# Patient Record
Sex: MALE | Race: BLACK OR AFRICAN AMERICAN | Employment: FULL TIME | URBAN - METROPOLITAN AREA
[De-identification: names, ages, dates, MRNs, and addresses within clinical notes are randomized per-mention and may not be internally consistent; named-entity substitution may affect disease eponyms.]

---

## 2017-01-26 ENCOUNTER — OFFICE VISIT (OUTPATIENT)
Dept: FAMILY MEDICINE CLINIC | Age: 57
End: 2017-01-26

## 2017-01-26 VITALS
SYSTOLIC BLOOD PRESSURE: 105 MMHG | HEIGHT: 70 IN | TEMPERATURE: 98 F | OXYGEN SATURATION: 97 % | BODY MASS INDEX: 29.95 KG/M2 | RESPIRATION RATE: 20 BRPM | HEART RATE: 82 BPM | WEIGHT: 209.2 LBS | DIASTOLIC BLOOD PRESSURE: 67 MMHG

## 2017-01-26 DIAGNOSIS — Z12.11 COLON CANCER SCREENING: ICD-10-CM

## 2017-01-26 DIAGNOSIS — J06.9 VIRAL UPPER RESPIRATORY TRACT INFECTION: ICD-10-CM

## 2017-01-26 DIAGNOSIS — E11.8 CONTROLLED TYPE 2 DIABETES MELLITUS WITH COMPLICATION, WITHOUT LONG-TERM CURRENT USE OF INSULIN (HCC): ICD-10-CM

## 2017-01-26 DIAGNOSIS — N18.6 END STAGE RENAL DISEASE (HCC): Primary | ICD-10-CM

## 2017-01-26 NOTE — PROGRESS NOTES
Giuliana Wynn is a 60 y.o. male in today for follow-up on thyroid, gout and kidney. Learning assessment previously completed; primary language is [place]. 1. Have you been to the ER, urgent care clinic since your last visit? Hospitalized since your last visit? No    2. Have you seen or consulted any other health care providers outside of the [address] [place] [address] since your last visit? Include any pap smears or colon screening.  Yes Reason for visit: [facility] for testing, January 2017

## 2017-01-26 NOTE — PATIENT INSTRUCTIONS

## 2017-01-27 NOTE — PROGRESS NOTES
Gout and Thyroid Problem        HPI: Sheramn Lombardi is a 60 y.o. male [address]  Here in follow up of his chronic medical problems of gout, ESRD and hyperthyroidism. He is seeing Dr Jaye Jordan and is currently on dialysis 3 times weekly. He has been placed on renal transplant list and has had consultation with transplant team.  He denies any recent gout flares and is using daily colcrys as well as uloric per Dr Jaye Jordan. He denies any adverse effects. Dr Janice Paula who manages his hyperthyroidism has decreased his dose of methimazole to 5mg daily. She is recommending thyroidectomy due to multiple complex cysts of the thyroid. He is requesting colonoscopy as it was recommended by transplant team and he has never had one int he past. He denies bloody or dark colored stools. He has no personal or family history of colon cancer        Past Medical History   Diagnosis Date    Altered mental status 09/26/2016    Cancer [facility]) years ago     kidney    ESRF (end stage renal failure) (HCC)     Gout     HTN (hypertension)     Thyroid disease     Vitamin D deficiency        Current Outpatient Prescriptions   Medication Sig    febuxostat (ULORIC) 40 mg tab tablet Take 40 mg by mouth daily.  colchicine (COLCRYS) 0.6 mg tablet Take 0.6 mg by mouth daily.  atenolol (TENORMIN) 50 mg tablet Take 100 mg by mouth daily. Per wife, pharmacy verify dose with md; states  [facility]    b complex-vitamin c-folic acid (NEPHROCAPS) 1 mg capsule Take 1 Cap by mouth daily.  ergocalciferol (ERGOCALCIFEROL) 50,000 unit capsule Take 1 Cap by mouth every seven (7) days.  methimazole (TAPAZOLE) 10 mg tablet Take 2 Tabs by mouth three (3) times daily (with meals).  acetaminophen (TYLENOL) 325 mg tablet Take  by mouth every four (4) hours as needed for Pain. No current facility-administered medications for this visit.         No Known Allergies    Past Surgical History Procedure Laterality Date    Hx nephrectomy      Hx orthopaedic       knee surgery       Family History   Problem Relation Age of Onset    Cancer Mother      breast    Heart Disease Father        Social History     Social History    Marital status:      Spouse name: N/A    Number of children: N/A    Years of education: N/A     Occupational History    Not on file. Social History Main Topics    Smoking status: Never Smoker    Smokeless tobacco: Never Used    Alcohol use No    Drug use: No    Sexual activity: Not on file     Other Topics Concern    Not on file     Social History Narrative       Review of Systems   Constitutional: Negative for chills and fever. HENT: Congestion: for 2 days. Respiratory: Negative for cough and shortness of breath. Cardiovascular: Negative for chest pain. Gastrointestinal: Negative for abdominal pain, blood in stool, diarrhea, melena, nausea and vomiting. Neurological: Positive for headaches (frontal pressure for the last 2 days). Visit Vitals    /67 (BP 1 Location: Right arm, BP Patient Position: Sitting)    Pulse 82    Temp 98 °F (36.7 °C) (Oral)    Resp 20    Ht 5' 10\" (1.778 m)    Wt 209 lb 3.2 oz (94.9 kg)    SpO2 97%    BMI 30.02 kg/m2       Physical Exam   Constitutional: He appears well-developed and well-nourished. No distress. HENT:   Head: Normocephalic and atraumatic. Right Ear: Hearing, tympanic membrane, external ear and ear canal normal.   Left Ear: Hearing, tympanic membrane, external ear and ear canal normal.   Nose: Mucosal edema and rhinorrhea present. Right sinus exhibits no maxillary sinus tenderness and no frontal sinus tenderness. Left sinus exhibits no maxillary sinus tenderness and no frontal sinus tenderness. Mouth/Throat: Oropharynx is clear and moist and mucous membranes are normal.   Neck: Neck supple. Cardiovascular: Normal rate, regular rhythm and normal heart sounds.     Pulmonary/Chest: Effort normal and breath sounds normal. No respiratory distress. He has no wheezes. He has no rales. Abdominal: Soft. Bowel sounds are normal.   Lymphadenopathy:     He has no cervical adenopathy. Skin: He is not diaphoretic. Nursing note and vitals reviewed. Assesment:  1. End stage renal disease ([facility] [address]  Continue dialysis and follow up with Dr Jerry Jack    2. Controlled type 2 diabetes mellitus with complication, without long-term current use of insulin (HCC)  Currently diet controlled    3. Colon cancer screening    - REFERRAL TO GASTROENTEROLOGY    4. Viral upper respiratory tract infection  Please make sure to get lots of rest, drink plenty of fluids at least eight 8-ounce glasses daily. Please start Vitamin C supplement of 1000mg daily and a daily multivitamin with zinc in it or a zinc supplementation. Use warm mist vaporizer/ humidifier and Vicks vaporub around the nose to help open up your nasal passages. Delsym is a very good cough syrup. Use ibuprofen as needed for pain and fevers. Please follow up if you are not improving in 1 week or if your symptoms change. I have discussed the diagnosis with the patient and the intended management  The patient has received an after-visit summary and questions were answered concerning future plans. I have discussed medication usage, side effects and warnings with the patient as well. I have reviewed the plan of care with the patient, accepted their input and they are in agreement with the treatment goals. Follow-up Disposition:  Return in about 4 months (around 5/26/2017) for hypertension.       Feroz Valero MD                .

## 2017-04-20 PROBLEM — Z12.11 SCREENING FOR COLON CANCER: Status: ACTIVE | Noted: 2017-04-20

## 2017-06-28 ENCOUNTER — OFFICE VISIT (OUTPATIENT)
Dept: FAMILY MEDICINE CLINIC | Age: 57
End: 2017-06-28

## 2017-06-28 VITALS
RESPIRATION RATE: 18 BRPM | TEMPERATURE: 97.7 F | HEIGHT: 70 IN | SYSTOLIC BLOOD PRESSURE: 119 MMHG | HEART RATE: 80 BPM | BODY MASS INDEX: 31.52 KG/M2 | WEIGHT: 220.2 LBS | OXYGEN SATURATION: 96 % | DIASTOLIC BLOOD PRESSURE: 77 MMHG

## 2017-06-28 DIAGNOSIS — E89.0 POSTOPERATIVE HYPOTHYROIDISM: Primary | ICD-10-CM

## 2017-06-28 DIAGNOSIS — G47.20 SLEEP PATTERN DISTURBANCE: ICD-10-CM

## 2017-06-28 RX ORDER — COLCHICINE 0.6 MG/1
0.6 TABLET ORAL
COMMUNITY
Start: 2016-10-24 | End: 2018-01-11

## 2017-06-28 RX ORDER — CALCIUM CARBONATE 200(500)MG
500 TABLET,CHEWABLE ORAL
COMMUNITY
End: 2018-01-11

## 2017-06-28 RX ORDER — FEBUXOSTAT 40 MG/1
40 TABLET, FILM COATED ORAL
COMMUNITY
Start: 2016-10-08 | End: 2018-01-11

## 2017-06-28 RX ORDER — LOPERAMIDE HCL 2 MG
TABLET ORAL
COMMUNITY
End: 2017-11-16

## 2017-06-28 RX ORDER — LEVOTHYROXINE SODIUM 150 UG/1
150 TABLET ORAL
COMMUNITY
Start: 2017-05-30

## 2017-06-28 NOTE — PROGRESS NOTES
Ronaldo Potts is a 60 y.o. male in today for hospital follow-up for thyroidectomy. Learning assessment previously completed; primary language is [place]. 1. Have you been to the ER, urgent care clinic since your last visit? Hospitalized since your last visit? Yes Reason for visit: [address], June 2017, Thyroidectomy    2. Have you seen or consulted any other health care providers outside of the [address] [place] [address] since your last visit? Include any pap smears or colon screening.  No

## 2017-06-28 NOTE — MR AVS SNAPSHOT
Visit Information Date & Time Provider Department Dept. Phone Encounter #  
 6/28/2017  1:30 PM Irl Memos, [address] [phone] [id number] Follow-up Instructions Return if symptoms worsen or fail to improve. Upcoming Health Maintenance Date Due Pneumococcal 19-64 Highest Risk (1 of 3 - PCV13) 7/25/1977 DTaP/Tdap/Td series (1 - Tdap) 7/25/1979 INFLUENZA AGE 9 TO ADULT 8/1/2017 COLONOSCOPY 4/20/2022 Allergies as of 6/28/2017  Review Complete On: 6/28/2017 By: Mark Reyes LPN No Known Allergies Current Immunizations  Never Reviewed No immunizations on file. Not reviewed this visit You Were Diagnosed With   
  
 Codes Comments Postoperative hypothyroidism    -  Primary ICD-10-CM: E89.0 ICD-9-CM: 244.0 Sleep pattern disturbance     ICD-10-CM: G47.20 ICD-9-CM: 780.50 Vitals BP Pulse Temp Resp Height(growth percentile) Weight(growth percentile) 119/77 (BP 1 Location: Right arm, BP Patient Position: Sitting) 80 97.7 °F (36.5 °C) (Oral) 18 5' 10\" (1.778 m) 220 lb 3.2 oz (99.9 kg) SpO2 BMI Smoking Status 96% 31.6 kg/m2 Never Smoker Vitals History BMI and BSA Data Body Mass Index Body Surface Area  
 31.6 kg/m 2 2.22 m 2 Preferred Pharmacy Pharmacy Name Phone [address] [place] [address], [facility] [place] 2[place] Home [address] [phone] Your Updated Medication List  
  
   
This list is accurate as of: 6/28/17  1:46 PM.  Always use your most recent med list.  
  
  
  
  
 b complex-vitamin c-folic acid 1 mg capsule Commonly known as:  Kori Laws Take 1 Cap by mouth daily. calcium carbonate 200 mg calcium (500 mg) Chew Commonly known as:  TUMS  
500 mg.  
  
 colchicine 0.6 mg tablet  
0.6 mg.  
  
 cyanocobalamin ER 1,000 mcg tablet Take  by Mouth.  
  
 ergocalciferol 50,000 unit capsule Commonly known as:  ERGOCALCIFEROL Take 1 Cap by mouth every seven (7) days. levothyroxine 150 mcg tablet Commonly known as:  SYNTHROID  
  
 SENSIPAR 30 mg tablet Generic drug:  cinacalcet Take 30 mg by mouth daily. TYLENOL 325 mg tablet Generic drug:  acetaminophen Take  by mouth every four (4) hours as needed for Pain. * ULORIC 40 mg Tab tablet Generic drug:  febuxostat Take 40 mg by mouth daily. * febuxostat 40 mg Tab tablet Commonly known as:  ULORIC  
40 mg.  
  
 * Notice: This list has 2 medication(s) that are the same as other medications prescribed for you. Read the directions carefully, and ask your doctor or other care provider to review them with you. Follow-up Instructions Return if symptoms worsen or fail to improve. Patient Instructions For sleep start with 5mg nightly. Can work your way up to 10mg nightly. Call me in 1 week if no improvement. For the scar- look into BioOil and ScarAway Colloid strips. Hypothyroidism: Care Instructions Your Care Instructions You have hypothyroidism, which means that your body is not making enough thyroid hormone. This hormone helps your body use energy. If your thyroid level is low, you may feel tired, be constipated, have an increase in your blood pressure, or have dry skin or memory problems. You may also get cold easily, even when it is warm. Women with low thyroid levels may have heavy menstrual periods. A blood test to find your thyroid-stimulating hormone (TSH) level is used to check for hypothyroidism. A high TSH level may mean that you have low thyroid. When your body is not making enough thyroid hormone, TSH levels rise in an effort to make the body produce more. The treatment for hypothyroidism is to take thyroid hormone pills. You should start to feel better in 1 to 2 weeks. But it can take several months to see changes in the TSH level.  You will need regular visits with your doctor to make sure you have the right dose of medicine. Most people need treatment for the rest of their lives. You will need to see your doctor regularly to have blood tests and to make sure you are doing well. Follow-up care is a key part of your treatment and safety. Be sure to make and go to all appointments, and call your doctor if you are having problems. Its also a good idea to know your test results and keep a list of the medicines you take. How can you care for yourself at home? · Take your thyroid hormone medicine exactly as prescribed. Call your doctor if you think you are having a problem with your medicine. Most people do not have side effects if they take the right amount of medicine regularly. ¨ Take the medicine 30 minutes before breakfast, and do not take it with calcium, vitamins, or iron. ¨ Do not take extra doses of your thyroid medicine. It will not help you get better any faster, and it may cause side effects. ¨ If you forget to take a dose, do NOT take a double dose of medicine. Take your usual dose the next day. · Tell your doctor about all prescription, herbal, or over-the-counter products you take. · Take care of yourself. Eat a healthy diet, get enough sleep, and get regular exercise. When should you call for help? Call 911 anytime you think you may need emergency care. For example, call if: 
· You passed out (lost consciousness). · You have severe trouble breathing. · You have a very slow heartbeat (less than 60 beats a minute). · You have a low body temperature (95°F or below). Call your doctor now or seek immediate medical care if: 
· You feel tired, sluggish, or weak. · You have trouble remembering things or concentrating. · You do not begin to feel better 2 weeks after starting your medicine. Watch closely for changes in your health, and be sure to contact your doctor if you have any problems. Where can you learn more? Go to http://murtaza-cayden.info/. Enter C260 in the search box to learn more about \"Hypothyroidism: Care Instructions. \" Current as of: July 28, 2016 Content Version: 11.3 © 3428-4327 [employer]. Care instructions adapted under license by [employer] (which disclaims liability or warranty for this information). If you have questions about a medical condition or this instruction, always ask your healthcare professional. [address] any warranty or liability for your use of this information. Introducing [facility]! Jess Cartwright introduces [employer] patient portal. Now you can access parts of your medical record, email your doctor's office, and request medication refills online. 1. In your internet browser, go to [email or web address] 2. Click on the First Time User? Click Here link in the Sign In box. You will see the New Member Sign Up page. 3. Enter your [employer] Access Code exactly as it appears below. You will not need to use this code after youve completed the sign-up process. If you do not sign up before the expiration date, you must request a new code. · [employer] Access Code: [email or web address] Expires: 7/18/2017 10:54 AM 
 
4. Enter the last four digits of your Social Security Number (xxxx) and Date of Birth (mm/dd/yyyy) as indicated and click Submit. You will be taken to the next sign-up page. 5. Create a [employer] ID. This will be your [employer] login ID and cannot be changed, so think of one that is secure and easy to remember. 6. Create a [employer] password. You can change your password at any time. 7. Enter your Password Reset Question and Answer. This can be used at a later time if you forget your password. 8. Enter your e-mail address. You will receive e-mail notification when new information is available in [address] 9. Click Sign Up. You can now view and download portions of your medical record. 10. Click the Download Summary menu link to download a portable copy of your medical information. If you have questions, please visit the Frequently Asked Questions section of the [employer] website. Remember, [employer] is NOT to be used for urgent needs. For medical emergencies, dial 911. Now available from your iPhone and Android! Please provide this summary of care documentation to your next provider. Your primary care clinician is listed as Radhika Clark. If you have any questions after today's visit, please call [phone].

## 2017-06-28 NOTE — PROGRESS NOTES
Hospital Follow Up        HPI: Laly Levine is a 60 y.o. male [address]  Here in hospital follow up for his thyroidectomy on 6/15. He reports he is feeling well other than some fatigue and hot/cold flashes. He was started on levothyroxine 150mcg daily which he is using daily as prescirbed. He has also noticed some insomnia since surgery and was advised to start melatonin by his endocrinologist. He has no other concerns today. Past Medical History:   Diagnosis Date    Altered mental status 09/26/2016    Cancer [facility]) years ago    kidney    ESRF (end stage renal failure) (HCC)     Gout     HTN (hypertension)     Thyroid disease     Vitamin D deficiency        Current Outpatient Prescriptions   Medication Sig    levothyroxine (SYNTHROID) 150 mcg tablet     colchicine 0.6 mg tablet 0.6 mg.    febuxostat (ULORIC) 40 mg tab tablet 40 mg.    cyanocobalamin ER 1,000 mcg tablet Take  by Mouth.  calcium carbonate (TUMS) 200 mg calcium (500 mg) chew 500 mg.    cinacalcet (SENSIPAR) 30 mg tablet Take 30 mg by mouth daily.  febuxostat (ULORIC) 40 mg tab tablet Take 40 mg by mouth daily.  b complex-vitamin c-folic acid (NEPHROCAPS) 1 mg capsule Take 1 Cap by mouth daily.  ergocalciferol (ERGOCALCIFEROL) 50,000 unit capsule Take 1 Cap by mouth every seven (7) days.  acetaminophen (TYLENOL) 325 mg tablet Take  by mouth every four (4) hours as needed for Pain. No current facility-administered medications for this visit.         No Known Allergies    Past Surgical History:   Procedure Laterality Date    CARDIAC SURG PROCEDURE UNLIST      cardiac cath    COLONOSCOPY N/A 4/20/2017    COLONOSCOPY w/ bx polypectomy performed by Blondell Cooks, MD at [facility] HX NEPHRECTOMY      HX ORTHOPAEDIC      knee surgery       Family History   Problem Relation Age of Onset    Cancer Mother      breast    Heart Disease Father        Social History     Social History    Marital status:      Spouse name: N/A    Number of children: N/A    Years of education: N/A     Occupational History    Not on file. Social History Main Topics    Smoking status: Never Smoker    Smokeless tobacco: Never Used    Alcohol use No    Drug use: No    Sexual activity: Not on file     Other Topics Concern    Not on file     Social History Narrative       Review of Systems   Constitutional: Negative for chills and fever. Respiratory: Negative for shortness of breath. Cardiovascular: Negative for chest pain and palpitations. Psychiatric/Behavioral: Negative for depression. The patient has insomnia. Visit Vitals    /77 (BP 1 Location: Right arm, BP Patient Position: Sitting)    Pulse 80    Temp 97.7 °F (36.5 °C) (Oral)    Resp 18    Ht 5' 10\" (1.778 m)    Wt 220 lb 3.2 oz (99.9 kg)    SpO2 96%    BMI 31.6 kg/m2       Physical Exam   Constitutional: He is oriented to person, place, and time. He appears well-developed and well-nourished. No distress. HENT:   Head: Normocephalic and atraumatic. Right Ear: External ear normal.   Left Ear: External ear normal.   Cardiovascular: Normal rate, regular rhythm and normal heart sounds. Neurological: He is alert and oriented to person, place, and time. Skin: Skin is warm and dry. He is not diaphoretic. Surgical scar noted on anterior lower neck. Well healed. Psychiatric: He has a normal mood and affect. Nursing note and vitals reviewed. Assesment:  1. Postoperative hypothyroidism  Continue levothyroxine as prescribed by endocrinologsit    2. Sleep pattern disturbance  Recommend starting melatonin 5mg nightly. Titrate to 10 mg if no improvement in sleep in 1 week. Call office if still not improving. Recommended use of Bio-Oil and/or scar away strips to help with scar improvement.         I have discussed the diagnosis with the patient and the intended management  The patient has received an after-visit summary and questions were answered concerning future plans. I have discussed medication usage, side effects and warnings with the patient as well. I have reviewed the plan of care with the patient, accepted their input and they are in agreement with the treatment goals. Follow-up Disposition:  Return if symptoms worsen or fail to improve.       Miquel Macias MD                .

## 2017-06-28 NOTE — PATIENT INSTRUCTIONS
For sleep start with 5mg nightly. Can work your way up to 10mg nightly. Call me in 1 week if no improvement. For the scar- look into BioOil and ScarAway Colloid strips. Hypothyroidism: Care Instructions  Your Care Instructions  You have hypothyroidism, which means that your body is not making enough thyroid hormone. This hormone helps your body use energy. If your thyroid level is low, you may feel tired, be constipated, have an increase in your blood pressure, or have dry skin or memory problems. You may also get cold easily, even when it is warm. Women with low thyroid levels may have heavy menstrual periods. A blood test to find your thyroid-stimulating hormone (TSH) level is used to check for hypothyroidism. A high TSH level may mean that you have low thyroid. When your body is not making enough thyroid hormone, TSH levels rise in an effort to make the body produce more. The treatment for hypothyroidism is to take thyroid hormone pills. You should start to feel better in 1 to 2 weeks. But it can take several months to see changes in the TSH level. You will need regular visits with your doctor to make sure you have the right dose of medicine. Most people need treatment for the rest of their lives. You will need to see your doctor regularly to have blood tests and to make sure you are doing well. Follow-up care is a key part of your treatment and safety. Be sure to make and go to all appointments, and call your doctor if you are having problems. Its also a good idea to know your test results and keep a list of the medicines you take. How can you care for yourself at home? · Take your thyroid hormone medicine exactly as prescribed. Call your doctor if you think you are having a problem with your medicine. Most people do not have side effects if they take the right amount of medicine regularly.   ¨ Take the medicine 30 minutes before breakfast, and do not take it with calcium, vitamins, or iron.  ¨ Do not take extra doses of your thyroid medicine. It will not help you get better any faster, and it may cause side effects. ¨ If you forget to take a dose, do NOT take a double dose of medicine. Take your usual dose the next day. · Tell your doctor about all prescription, herbal, or over-the-counter products you take. · Take care of yourself. Eat a healthy diet, get enough sleep, and get regular exercise. When should you call for help? Call 911 anytime you think you may need emergency care. For example, call if:  · You passed out (lost consciousness). · You have severe trouble breathing. · You have a very slow heartbeat (less than 60 beats a minute). · You have a low body temperature (95°F or below). Call your doctor now or seek immediate medical care if:  · You feel tired, sluggish, or weak. · You have trouble remembering things or concentrating. · You do not begin to feel better 2 weeks after starting your medicine. Watch closely for changes in your health, and be sure to contact your doctor if you have any problems. Where can you learn more? Go to http://murtaza-cayden.info/. Enter O134 in the search box to learn more about \"Hypothyroidism: Care Instructions. \"  Current as of: July 28, 2016  Content Version: 11.3  © 0244-1360 [employer]wise[employer]. Care instructions adapted under license by [employer] (which disclaims liability or warranty for this information). If you have questions about a medical condition or this instruction, always ask your healthcare professional. [place] [address] any warranty or liability for your use of this information.

## 2017-10-02 ENCOUNTER — OFFICE VISIT (OUTPATIENT)
Dept: FAMILY MEDICINE CLINIC | Age: 57
End: 2017-10-02

## 2017-10-02 VITALS
DIASTOLIC BLOOD PRESSURE: 73 MMHG | BODY MASS INDEX: 32.78 KG/M2 | WEIGHT: 229 LBS | RESPIRATION RATE: 18 BRPM | OXYGEN SATURATION: 98 % | SYSTOLIC BLOOD PRESSURE: 115 MMHG | TEMPERATURE: 97.6 F | HEIGHT: 70 IN | HEART RATE: 69 BPM

## 2017-10-02 DIAGNOSIS — R53.83 FATIGUE, UNSPECIFIED TYPE: Primary | ICD-10-CM

## 2017-10-02 DIAGNOSIS — R29.818 SUSPECTED SLEEP APNEA: ICD-10-CM

## 2017-10-02 DIAGNOSIS — E66.09 CLASS 1 OBESITY DUE TO EXCESS CALORIES WITH SERIOUS COMORBIDITY AND BODY MASS INDEX (BMI) OF 32.0 TO 32.9 IN ADULT: ICD-10-CM

## 2017-10-02 PROBLEM — Z99.2 ESRD (END STAGE RENAL DISEASE) ON DIALYSIS (HCC): Status: ACTIVE | Noted: 2017-10-02

## 2017-10-02 PROBLEM — E89.0 POSTOPERATIVE HYPOTHYROIDISM: Status: ACTIVE | Noted: 2017-10-02

## 2017-10-02 PROBLEM — E89.0 HISTORY OF THYROIDECTOMY: Status: ACTIVE | Noted: 2017-10-02

## 2017-10-02 PROBLEM — N18.6 ESRD (END STAGE RENAL DISEASE) ON DIALYSIS (HCC): Status: ACTIVE | Noted: 2017-10-02

## 2017-10-02 NOTE — PROGRESS NOTES
Partha Davis is a 59 y.o. male here for a follow up. He is concerned about variations of energy levels. Sometimes he has a completed lack of energy. It is usually in the mornings when he wakes up. He has talked to his Endocrinologist about this. Learning assessment previously completed. 1. Have you been to the ER, urgent care clinic or hospitalized since your last visit? Thyroidectomy at [facility]    2. Have you seen or consulted any other health care providers outside of the [address] [place] [address] since your last visit (Include any pap smears or colon screening)? Endocrinologist Dr. Cory White    Do you have an Advanced Directive? yes    Would you like information on Advanced Directives?  no

## 2017-10-02 NOTE — MR AVS SNAPSHOT
Visit Information Date & Time Provider Department Dept. Phone Encounter #  
 10/2/2017  8:30 AM Carley Conner, [address] [phone] [id number] Follow-up Instructions Return if symptoms worsen or fail to improve. Upcoming Health Maintenance Date Due Pneumococcal 19-64 Highest Risk (1 of 3 - PCV13) 7/25/1977 DTaP/Tdap/Td series (1 - Tdap) 7/25/1979 COLONOSCOPY 4/20/2022 Allergies as of 10/2/2017  Review Complete On: 10/2/2017 By: Rosaline Wyatt LPN No Known Allergies Current Immunizations  Never Reviewed No immunizations on file. Not reviewed this visit You Were Diagnosed With   
  
 Codes Comments Fatigue, unspecified type    -  Primary ICD-10-CM: R53.83 ICD-9-CM: 780.79 Class 1 obesity due to excess calories with serious comorbidity and body mass index (BMI) of 32.0 to 32.9 in adult     ICD-10-CM: E66.09, Z68.32 
ICD-9-CM: 278.00, V85.32 Vitals BP Pulse Temp Resp Height(growth percentile) Weight(growth percentile) 115/73 (BP 1 Location: Right arm, BP Patient Position: Sitting) 69 97.6 °F (36.4 °C) (Oral) 18 5' 10\" (1.778 m) 229 lb (103.9 kg) SpO2 BMI Smoking Status 98% 32.86 kg/m2 Never Smoker Vitals History BMI and BSA Data Body Mass Index Body Surface Area  
 32.86 kg/m 2 2.27 m 2 Preferred Pharmacy Pharmacy Name Phone [address], [facility] [address] [phone] Your Updated Medication List  
  
   
This list is accurate as of: 10/2/17  9:29 AM.  Always use your most recent med list.  
  
  
  
  
 b complex-vitamin c-folic acid 1 mg capsule Commonly known as:  Tamara Chambers Take 1 Cap by mouth daily. calcium carbonate 200 mg calcium (500 mg) Chew Commonly known as:  TUMS  
500 mg.  
  
 colchicine 0.6 mg tablet  
0.6 mg.  
  
 cyanocobalamin ER 1,000 mcg tablet Take  by Mouth.  
  
 ergocalciferol 50,000 unit capsule Commonly known as:  ERGOCALCIFEROL Take 1 Cap by mouth every seven (7) days. levothyroxine 150 mcg tablet Commonly known as:  SYNTHROID  
  
 SENSIPAR 30 mg tablet Generic drug:  cinacalcet Take 30 mg by mouth daily. TYLENOL 325 mg tablet Generic drug:  acetaminophen Take  by mouth every four (4) hours as needed for Pain. * ULORIC 40 mg Tab tablet Generic drug:  febuxostat Take 40 mg by mouth daily. * febuxostat 40 mg Tab tablet Commonly known as:  ULORIC  
40 mg.  
  
 * Notice: This list has 2 medication(s) that are the same as other medications prescribed for you. Read the directions carefully, and ask your doctor or other care provider to review them with you. Follow-up Instructions Return if symptoms worsen or fail to improve. Introducing [facility] SERVICES! [facility] introduces Dynamics patient portal. Now you can access parts of your medical record, email your doctor's office, and request medication refills online. 1. In your internet browser, go to [email or web address] 2. Click on the First Time User? Click Here link in the Sign In box. You will see the New Member Sign Up page. 3. Enter your Dynamics Access Code exactly as it appears below. You will not need to use this code after youve completed the sign-up process. If you do not sign up before the expiration date, you must request a new code. · Dynamics Access Code: [email or web address] Expires: 12/31/2017  9:28 AM 
 
4. Enter the last four digits of your Social Security Number (xxxx) and Date of Birth (mm/dd/yyyy) as indicated and click Submit. You will be taken to the next sign-up page. 5. Create a Logicworkst ID. This will be your Dynamics login ID and cannot be changed, so think of one that is secure and easy to remember. 6. Create a Dynamics password. You can change your password at any time. 7. Enter your Password Reset Question and Answer. This can be used at a later time if you forget your password. 8. Enter your e-mail address. You will receive e-mail notification when new information is available in [address] 9. Click Sign Up. You can now view and download portions of your medical record. 10. Click the Download Summary menu link to download a portable copy of your medical information. If you have questions, please visit the Frequently Asked Questions section of the ShopSpot website. Remember, ShopSpot is NOT to be used for urgent needs. For medical emergencies, dial 911. Now available from your iPhone and Android! Please provide this summary of care documentation to your next provider. Your primary care clinician is listed as Jordin Forbes. If you have any questions after today's visit, please call [phone].

## 2017-10-02 NOTE — PATIENT INSTRUCTIONS
Sleep Apnea: Care Instructions  Your Care Instructions  Sleep apnea means that you frequently stop breathing for 10 seconds or longer during sleep. It can be mild to severe, based on the number of times an hour that you stop breathing or have slowed breathing. Blocked or narrowed airways in your nose, mouth, or throat can cause sleep apnea. Your airway can become blocked when your throat muscles and tongue relax during sleep. You can treat sleep apnea at home by making lifestyle changes. You also can use a CPAP breathing machine that keeps tissues in the throat from blocking your airway. Or your doctor may suggest that you use a breathing device while you sleep. It helps keep your airway open. This could be a device that you put in your mouth. Other examples include strips or disks that you use on your nose. In some cases, surgery may be needed to remove enlarged tissues in the throat. Follow-up care is a key part of your treatment and safety. Be sure to make and go to all appointments, and call your doctor if you are having problems. It's also a good idea to know your test results and keep a list of the medicines you take. How can you care for yourself at home? · Lose weight, if needed. It may reduce the number of times you stop breathing or have slowed breathing. · Sleep on your side. It may stop mild apnea. If you tend to roll onto your back, sew a pocket in the back of your pajama top. Put a tennis ball into the pocket, and stitch the pocket shut. This will help keep you from sleeping on your back. · Avoid alcohol and medicines such as sleeping pills and sedatives before bed. · Do not smoke. Smoking can make sleep apnea worse. If you need help quitting, talk to your doctor about stop-smoking programs and medicines. These can increase your chances of quitting for good. · Prop up the head of your bed 4 to 6 inches by putting bricks under the legs of the bed.   · Treat breathing problems, such as a stuffy nose, caused by a cold or allergies. · Try a continuous positive airway pressure (CPAP) breathing machine if your doctor recommends it. The machine keeps your airway open when you sleep. · If CPAP does not work for you, ask your doctor if you can try other breathing machines. A bilevel positive airway pressure machine uses one type of air pressure for breathing in and another type for breathing out. Another device raises or lowers air pressure as needed while you breathe. · Talk to your doctor if:  ¨ Your nose feels dry or bleeds when you use one of these machines. You may need to increase moisture in the air. A humidifier may help. ¨ Your nose is runny or stuffy from using a breathing machine. Decongestants or a corticosteroid nasal spray may help. ¨ You are sleepy during the day and it gets in the way of the normal things you do. Do not drive when you are drowsy. When should you call for help? Watch closely for changes in your health, and be sure to contact your doctor if:  · You still have sleep apnea even though you have made lifestyle changes. · You are thinking of trying a device such as CPAP. · You are having problems using a CPAP or similar machine. Where can you learn more? Go to http://murtaza-cayden.info/. Enter E051 in the search box to learn more about \"Sleep Apnea: Care Instructions. \"  Current as of: March 25, 2017  Content Version: 11.3  © 6956-8905 [employer]Compare. Care instructions adapted under license by Ling[employer] (which disclaims liability or warranty for this information). If you have questions about a medical condition or this instruction, always ask your healthcare professional. [place] [address] any warranty or liability for your use of this information.

## 2017-10-02 NOTE — PROGRESS NOTES
Thyroid Problem        HPI: Mik Stephens is a 59 y.o. male [address]  With history of Graves Dx s/p thyroidectomy,  He is currently on thyroid replacement medication and reports values are normal. He sees Dr Mu Rm for this problem. He is concerned for fatigue. His energy levels are not back to where they were prior to thyroid problems. He also has ESRD and is on dialysis,  M, W, F.  Managed by Dr Jovon Snow. He is more fatigued on dialysis days. But still is significantly on non-dialysis days. He was told that he has prediabetes per Dr Mu Rm but it was suggested to him not to be on medication. He is apparently on a high protenin low carb diet. He is trying to exercises but his fatigue is preventing him from doing so to any significant level. He reports his sleep is improved and is sleeping now 6 hours nightly. He does occasionally snore. He denies any symptoms associated with restless legs. Past Medical History:   Diagnosis Date    Altered mental status 09/26/2016    Cancer [facility]) years ago    kidney    ESRF (end stage renal failure) (HCC)     Gout     HTN (hypertension)     Thyroid disease     Vitamin D deficiency        Current Outpatient Prescriptions   Medication Sig    levothyroxine (SYNTHROID) 150 mcg tablet     colchicine 0.6 mg tablet 0.6 mg.    febuxostat (ULORIC) 40 mg tab tablet 40 mg.    cyanocobalamin ER 1,000 mcg tablet Take  by Mouth.  calcium carbonate (TUMS) 200 mg calcium (500 mg) chew 500 mg.    cinacalcet (SENSIPAR) 30 mg tablet Take 30 mg by mouth daily.  febuxostat (ULORIC) 40 mg tab tablet Take 40 mg by mouth daily.  acetaminophen (TYLENOL) 325 mg tablet Take  by mouth every four (4) hours as needed for Pain.  b complex-vitamin c-folic acid (NEPHROCAPS) 1 mg capsule Take 1 Cap by mouth daily.  ergocalciferol (ERGOCALCIFEROL) 50,000 unit capsule Take 1 Cap by mouth every seven (7) days.      No current facility-administered medications for this visit. No Known Allergies    Past Surgical History:   Procedure Laterality Date    CARDIAC SURG PROCEDURE UNLIST      cardiac cath    COLONOSCOPY N/A 4/20/2017    COLONOSCOPY w/ bx polypectomy performed by Asad Cortez MD at [address] [facility] HX NEPHRECTOMY      HX ORTHOPAEDIC      knee surgery       Family History   Problem Relation Age of Onset    Cancer Mother      breast    Heart Disease Father        Social History     Social History    Marital status:      Spouse name: N/A    Number of children: N/A    Years of education: N/A     Occupational History    Not on file. Social History Main Topics    Smoking status: Never Smoker    Smokeless tobacco: Never Used    Alcohol use No    Drug use: No    Sexual activity: Not on file     Other Topics Concern    Not on file     Social History Narrative       Review of Systems   Constitutional: Positive for malaise/fatigue. Negative for chills, fever and weight loss. Respiratory: Negative for shortness of breath. Cardiovascular: Negative for chest pain and palpitations. Visit Vitals    /73 (BP 1 Location: Right arm, BP Patient Position: Sitting)    Pulse 69    Temp 97.6 °F (36.4 °C) (Oral)    Resp 18    Ht 5' 10\" (1.778 m)    Wt 229 lb (103.9 kg)    SpO2 98%    BMI 32.86 kg/m2       GEN- NAD, AAOx3, obese  NECK- 17.25' circumference  CVS- RRR, +S1, +S2, No Murmurs, No JVD  PULM- CTABL, No W/R/R  EXT- No edema  NEURO-Normal Gait  PSYCH- Euthymic, normal afffect        Assesment:  1. Fatigue, unspecified type  Unclear etiology. Likely multifactorial including prediabete and ESRD on dialysis. Patient has symptoms and signs associated with sleep apnea including obesity, snoring and neck circumference >16\" (17.25\"). I asked that he discuss his fatigue with Dr Rolf Vizcarra, endocrinology, for completion of evaluation for diabetes.      2. Class 1 obesity due to excess calories with serious comorbidity and body mass index (BMI) of 32.0 to 32.9 in adult      3. Suspected sleep apnea  Patient referred for  home sleep study thru [facility]      I have discussed the diagnosis with the patient and the intended management  The patient has received an after-visit summary and questions were answered concerning future plans. I have discussed medication usage, side effects and warnings with the patient as well. I have reviewed the plan of care with the patient, accepted their input and they are in agreement with the treatment goals. Follow-up Disposition:  Return if symptoms worsen or fail to improve.   Pending sleep study    Jonetta Schilder, MD                .

## 2017-11-16 PROBLEM — Z85.528 HISTORY OF RENAL CELL CARCINOMA: Status: ACTIVE | Noted: 2017-11-16

## 2018-01-15 PROBLEM — T83.89XA RETAINED URETERAL STENT OF TRANSPLANTED KIDNEY (HCC): Status: ACTIVE | Noted: 2018-01-15

## 2018-01-15 PROBLEM — T86.19 RETAINED URETERAL STENT OF TRANSPLANTED KIDNEY (HCC): Status: ACTIVE | Noted: 2018-01-15

## 2018-01-29 ENCOUNTER — OFFICE VISIT (OUTPATIENT)
Dept: FAMILY MEDICINE CLINIC | Age: 58
End: 2018-01-29

## 2018-01-29 VITALS
WEIGHT: 219.2 LBS | DIASTOLIC BLOOD PRESSURE: 84 MMHG | RESPIRATION RATE: 18 BRPM | SYSTOLIC BLOOD PRESSURE: 136 MMHG | TEMPERATURE: 99 F | HEIGHT: 70 IN | OXYGEN SATURATION: 94 % | HEART RATE: 94 BPM | BODY MASS INDEX: 31.38 KG/M2

## 2018-01-29 DIAGNOSIS — Z00.00 ANNUAL PHYSICAL EXAM: Primary | ICD-10-CM

## 2018-01-29 RX ORDER — KETOCONAZOLE 200 MG/1
100 TABLET ORAL DAILY
COMMUNITY

## 2018-01-29 RX ORDER — PREDNISONE 5 MG/1
15 TABLET ORAL DAILY
COMMUNITY

## 2018-01-29 RX ORDER — TACROLIMUS 1 MG/1
4 CAPSULE ORAL EVERY 12 HOURS
COMMUNITY

## 2018-01-29 RX ORDER — SULFAMETHOXAZOLE AND TRIMETHOPRIM 800; 160 MG/1; MG/1
1 TABLET ORAL 2 TIMES DAILY
COMMUNITY

## 2018-01-29 RX ORDER — PREDNISONE 20 MG/1
TABLET ORAL
COMMUNITY
End: 2018-01-29

## 2018-01-29 NOTE — PATIENT INSTRUCTIONS

## 2018-01-29 NOTE — PROGRESS NOTES
Som Bar is a 59 y.o. male here today for a CPE. He is 2 month post kidney transplant. No concerns at this time. Had pneumonia vaccine after surgery. Learning assessment previously completed. 1. Have you been to the ER, urgent care clinic or hospitalized since your last visit? Kidney transplant in November [facility]    2. Have you seen or consulted any other health care providers outside of the [address] [place] [address] since your last visit (Include any pap smears or colon screening)? Dr. Preeti Tate with [facility], Dr. Patricia Forrest for Endo    Do you have an Advanced Directive? Yes    Would you like information on Advanced Directives?  no

## 2018-01-29 NOTE — MR AVS SNAPSHOT
Demian Thorne 
 
 
 [facility] [address] [address] 
[phone] Patient: Selina Cleveland MRN: [id number] FCQ:[birth year] Visit Information Date & Time Provider Department Dept. Phone Encounter #  
 1/29/2018  4:30 PM [address] [facility], [employer] [phone] [id number] Upcoming Health Maintenance Date Due DTaP/Tdap/Td series (1 - Tdap) 7/25/1979 Pneumococcal 19-64 Highest Risk (2 of 3 - PCV13) 11/6/2018 COLONOSCOPY 4/20/2022 Allergies as of 1/29/2018  Review Complete On: 1/29/2018 By: Kym Severance, LPN No Known Allergies Current Immunizations  Never Reviewed No immunizations on file. Not reviewed this visit You Were Diagnosed With   
  
 Codes Comments Annual physical exam    -  Primary ICD-10-CM: Z00.00 ICD-9-CM: V70.0 Vitals BP Pulse Temp Resp Height(growth percentile) Weight(growth percentile) 136/84 (BP 1 Location: Right arm, BP Patient Position: Sitting) 94 99 °F (37.2 °C) (Oral) 18 5' 10\" (1.778 m) 219 lb 3.2 oz (99.4 kg) SpO2 BMI Smoking Status 94% 31.45 kg/m2 Never Smoker Vitals History BMI and BSA Data Body Mass Index Body Surface Area  
 31.45 kg/m 2 2.22 m 2 Preferred Pharmacy Pharmacy Name Phone [facility], [facility] [address] [phone] Your Updated Medication List  
  
   
This list is accurate as of: 1/29/18  5:14 PM.  Always use your most recent med list.  
  
  
  
  
 BACTRIM -800 mg per tablet Generic drug:  trimethoprim-sulfamethoxazole Take 1 Tab by mouth two (2) times a day. COREG PO Take 12.5 mg by mouth two (2) times a day. DAPSONE PO Take 100 mg by mouth daily. ketoconazole 200 mg tablet Commonly known as:  NIZORAL Take 100 mg by mouth daily. levothyroxine 150 mcg tablet Commonly known as:  SYNTHROID  
150 mcg. MYFORTIC PO Take 720 mg by mouth two (2) times a day. PEPCID 20 mg tablet Generic drug:  famotidine Take 20 mg by mouth daily. Indications: HEARTBURN PREVENTION  
  
 predniSONE 5 mg tablet Commonly known as:  Pattie Del Rosario Take 15 mg by mouth daily. * PROGRAF PO Take 10 mg by mouth every twelve (12) hours. * tacrolimus 1 mg capsule Commonly known as:  PROGRAF Take 4 mg by mouth every twelve (12) hours. RELION CONFIRM-MICRO strip Generic drug:  glucose blood VI test strips  
by Does Not Apply route See Admin Instructions. TYLENOL 325 mg tablet Generic drug:  acetaminophen Take  by mouth every four (4) hours as needed for Pain. VALCYTE PO Take 900 mg by mouth daily. VITAMIN D3 PO Take 2,000 Units by mouth daily. * Notice: This list has 2 medication(s) that are the same as other medications prescribed for you. Read the directions carefully, and ask your doctor or other care provider to review them with you. Patient Instructions Well Visit, Men 48 to 72: Care Instructions Your Care Instructions Physical exams can help you stay healthy. Your doctor has checked your overall health and may have suggested ways to take good care of yourself. He or she also may have recommended tests. At home, you can help prevent illness with healthy eating, regular exercise, and other steps. Follow-up care is a key part of your treatment and safety. Be sure to make and go to all appointments, and call your doctor if you are having problems. It's also a good idea to know your test results and keep a list of the medicines you take. How can you care for yourself at home? · Reach and stay at a healthy weight. This will lower your risk for many problems, such as obesity, diabetes, heart disease, and high blood pressure. · Get at least 30 minutes of exercise on most days of the week.  Walking is a good choice. You also may want to do other activities, such as running, swimming, cycling, or playing tennis or team sports. · Do not smoke. Smoking can make health problems worse. If you need help quitting, talk to your doctor about stop-smoking programs and medicines. These can increase your chances of quitting for good. · Protect your skin from too much sun. When you're outdoors from 10 a.m. to 4 p.m., stay in the shade or cover up with clothing and a hat with a wide brim. Wear sunglasses that block UV rays. Even when it's cloudy, put broad-spectrum sunscreen (SPF 30 or higher) on any exposed skin. · See a dentist one or two times a year for checkups and to have your teeth cleaned. · Wear a seat belt in the car. · Limit alcohol to 2 drinks a day. Too much alcohol can cause health problems. Follow your doctor's advice about when to have certain tests. These tests can spot problems early. · Cholesterol. Your doctor will tell you how often to have this done based on your overall health and other things that can increase your risk for heart attack and stroke. · Blood pressure. Have your blood pressure checked during a routine doctor visit. Your doctor will tell you how often to check your blood pressure based on your age, your blood pressure results, and other factors. · Prostate exam. Talk to your doctor about whether you should have a blood test (called a PSA test) for prostate cancer. Experts disagree on whether men should have this test. Some experts recommend that you discuss the benefits and risks of the test with your doctor. · Diabetes. Ask your doctor whether you should have tests for diabetes. · Vision. Some experts recommend that you have yearly exams for glaucoma and other age-related eye problems starting at age 48. · Hearing. Tell your doctor if you notice any change in your hearing. You can have tests to find out how well you hear. · Colon cancer. You should begin tests for colon cancer at age 48. You may have one of several tests. Your doctor will tell you how often to have tests based on your age and risk. Risks include whether you already had a precancerous polyp removed from your colon or whether your parent, brother, sister, or child has had colon cancer. · Heart attack and stroke risk. At least every 4 to 6 years, you should have your risk for heart attack and stroke assessed. Your doctor uses factors such as your age, blood pressure, cholesterol, and whether you smoke or have diabetes to show what your risk for a heart attack or stroke is over the next 10 years. · Abdominal aortic aneurysm. Ask your doctor whether you should have a test to check for an aneurysm. You may need a test if you ever smoked or if your parent, brother, sister, or child has had an aneurysm. When should you call for help? Watch closely for changes in your health, and be sure to contact your doctor if you have any problems or symptoms that concern you. Where can you learn more? Go to http://murtaza-cayden.info/. Enter Z976 in the search box to learn more about \"Well Visit, Men 48 to 72: Care Instructions. \" Current as of: May 12, 2017 Content Version: 11.4 © 7658-1168 Healthwise, Incorporated. Care instructions adapted under license by [employer] (which disclaims liability or warranty for this information). If you have questions about a medical condition or this instruction, always ask your healthcare professional. [place] [address] any warranty or liability for your use of this information. Introducing [facility] & HE[facility] SERVICES! [facility] introduces iAgree patient portal. Now you can access parts of your medical record, email your doctor's office, and request medication refills online. 1. In your internet browser, go to [email or web address] 2. Click on the First Time User? Click Here link in the Sign In box. You will see the New Member Sign Up page. 3. Enter your Chimerix Access Code exactly as it appears below. You will not need to use this code after youve completed the sign-up process. If you do not sign up before the expiration date, you must request a new code. · Chimerix Access Code: [email or web address] Expires: 4/12/2018  2:05 PM 
 
4. Enter the last four digits of your Social Security Number (xxxx) and Date of Birth (mm/dd/yyyy) as indicated and click Submit. You will be taken to the next sign-up page. 5. Create a Chimerix ID. This will be your Chimerix login ID and cannot be changed, so think of one that is secure and easy to remember. 6. Create a Chimerix password. You can change your password at any time. 7. Enter your Password Reset Question and Answer. This can be used at a later time if you forget your password. 8. Enter your e-mail address. You will receive e-mail notification when new information is available in [address] 9. Click Sign Up. You can now view and download portions of your medical record. 10. Click the Download Summary menu link to download a portable copy of your medical information. If you have questions, please visit the Frequently Asked Questions section of the Chimerix website. Remember, Chimerix is NOT to be used for urgent needs. For medical emergencies, dial 911. Now available from your iPhone and Android! Please provide this summary of care documentation to your next provider. Your primary care clinician is listed as [place]. If you have any questions after today's visit, please call [phone].

## 2018-01-31 NOTE — PROGRESS NOTES
Complete Physical        HPI: Cheo Nieto is a 59 y.o. male [address]  Here for annual exam. He is nearly 3 months post operative after renal transplant. He has had his renal stent placed. He reports energy levels are good. He feels well overall. He reports Dr Mirtha Estes is not recommending diabetes medication at this point and to continue to monitor blood sugars and work on diet and exercise. He has had normal uric acid levels and no gout flares so his uloric was discontinued     He is now using 10 mg of prograf twice a day.             Past Medical History:   Diagnosis Date    Altered mental status 09/26/2016    Cancer [facility]) years ago    kidney    Chronic kidney disease     resolved d/t transplant    ESRF (end stage renal failure) (HCC)     Gout     HTN (hypertension)     Thyroid disease     Vitamin D deficiency        Current Outpatient Prescriptions   Medication Sig    predniSONE (DELTASONE) 5 mg tablet Take 15 mg by mouth daily.  tacrolimus (PROGRAF) 1 mg capsule Take 4 mg by mouth every twelve (12) hours.  ketoconazole (NIZORAL) 200 mg tablet Take 100 mg by mouth daily.  trimethoprim-sulfamethoxazole (BACTRIM DS) 160-800 mg per tablet Take 1 Tab by mouth two (2) times a day.  glucose blood VI test strips (RELION CONFIRM-MICRO) strip by Does Not Apply route See Admin Instructions.  famotidine (PEPCID) 20 mg tablet Take 20 mg by mouth daily. Indications: HEARTBURN PREVENTION    CARVEDILOL (COREG PO) Take 12.5 mg by mouth two (2) times a day.  TACROLIMUS (PROGRAF PO) Take 10 mg by mouth every twelve (12) hours.  MYCOPHENOLATE SODIUM (MYFORTIC PO) Take 720 mg by mouth two (2) times a day.  VALGANCICLOVIR HCL (VALCYTE PO) Take 900 mg by mouth daily.  DAPSONE PO Take 100 mg by mouth daily.  CHOLECALCIFEROL, VITAMIN D3, (VITAMIN D3 PO) Take 2,000 Units by mouth daily.  levothyroxine (SYNTHROID) 150 mcg tablet 150 mcg.     acetaminophen (TYLENOL) 325 mg tablet Take  by mouth every four (4) hours as needed for Pain. No current facility-administered medications for this visit. No Known Allergies    Past Surgical History:   Procedure Laterality Date    CARDIAC SURG PROCEDURE UNLIST      cardiac cath    COLONOSCOPY N/A 4/20/2017    COLONOSCOPY w/ bx polypectomy performed by Anne Marie Cartagena MD at [address] [place] [address] HX NEPHRECTOMY      HX ORTHOPAEDIC      knee surgery    HX RENAL TRANSPLANT  11/21/2017    Living related kidney transplant into right iliac fossa with preparation of living donor kidney, placement of left internal jugular triple-lumen central line with ultrasound and x-ray guidance.  TRANSPLANTATION OF KIDNEY Right 11/2017       Family History   Problem Relation Age of Onset    Cancer Mother      breast    Heart Disease Father        Social History     Social History    Marital status:      Spouse name: N/A    Number of children: N/A    Years of education: N/A     Occupational History    Not on file. Social History Main Topics    Smoking status: Never Smoker    Smokeless tobacco: Never Used    Alcohol use No    Drug use: No    Sexual activity: Not on file     Other Topics Concern    Not on file     Social History Narrative       Review of Systems   Constitutional: Negative for chills, fever and weight loss. Eyes: Negative for blurred vision. Respiratory: Negative for cough, shortness of breath and wheezing. Cardiovascular: Negative for chest pain, palpitations and claudication. Gastrointestinal: Negative for abdominal pain, blood in stool, heartburn, melena and nausea. Genitourinary: Negative for dysuria, frequency, hematuria and urgency. Musculoskeletal: Negative for joint pain and myalgias. Skin: Negative for rash. Neurological: Negative for dizziness, seizures and headaches. Endo/Heme/Allergies: Negative for environmental allergies and polydipsia.    Psychiatric/Behavioral: Negative for depression, substance abuse and suicidal ideas. The patient does not have insomnia. Visit Vitals    /84 (BP 1 Location: Right arm, BP Patient Position: Sitting)    Pulse 94    Temp 99 °F (37.2 °C) (Oral)    Resp 18    Ht 5' 10\" (1.778 m)    Wt 219 lb 3.2 oz (99.4 kg)    SpO2 94%    BMI 31.45 kg/m2       Physical Exam   Constitutional: He is oriented to person, place, and time. He appears well-developed and well-nourished. HENT:   Head: Normocephalic and atraumatic. Right Ear: Hearing, tympanic membrane, external ear and ear canal normal.   Left Ear: Hearing, tympanic membrane, external ear and ear canal normal.   Eyes: Conjunctivae and EOM are normal. Pupils are equal, round, and reactive to light. Neck: Trachea normal, normal range of motion and full passive range of motion without pain. Neck supple. No JVD present. Carotid bruit is not present. No tracheal deviation present. No thyroid mass present. Cardiovascular: Normal rate, regular rhythm and normal heart sounds. Pulmonary/Chest: Effort normal and breath sounds normal. No respiratory distress. He has no wheezes. He has no rales. Abdominal: Soft. Bowel sounds are normal. He exhibits no distension and no mass. There is no hepatomegaly. There is no tenderness. There is no rebound and no guarding. Musculoskeletal: Normal range of motion. He exhibits no edema. Neurological: He is alert and oriented to person, place, and time. He has normal strength and normal reflexes. No cranial nerve deficit. Gait normal.   Reflex Scores:       Patellar reflexes are 2+ on the right side and 2+ on the left side. Achilles reflexes are 2+ on the right side and 2+ on the left side. Skin: Skin is warm and dry. Complete skin exam negative for lesions concerning for maligancy   Psychiatric: He has a normal mood and affect. Nursing note and vitals reviewed. Assesment:  1. Annual physical exam  No significant exam findings. Feeling well. Advised to continue working on weight loss. He is working on diet and exercise. 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 Discussed Tamiflu contraindication with ESRD and although he is renal transplant I asked that he discuss this with nephrologist first        I have discussed the diagnosis with the patient and the intended management  The patient has received an after-visit summary and questions were answered concerning future plans. I have discussed medication usage, side effects and warnings with the patient as well. I have reviewed the plan of care with the patient, accepted their input and they are in agreement with the treatment goals.          Follow-up Disposition:  Return in about 1 year (around 1/29/2019) for Annual exam.      Yvette Wagner MD                .

## 2019-09-25 PROBLEM — Z12.11 SCREENING FOR COLON CANCER: Status: RESOLVED | Noted: 2017-04-20 | Resolved: 2019-09-25
